# Patient Record
Sex: FEMALE | Race: WHITE | URBAN - METROPOLITAN AREA
[De-identification: names, ages, dates, MRNs, and addresses within clinical notes are randomized per-mention and may not be internally consistent; named-entity substitution may affect disease eponyms.]

---

## 2019-11-01 ENCOUNTER — MEDICAL CORRESPONDENCE (OUTPATIENT)
Dept: HEALTH INFORMATION MANAGEMENT | Facility: CLINIC | Age: 64
End: 2019-11-01

## 2019-11-01 ENCOUNTER — OFFICE VISIT (OUTPATIENT)
Dept: AUDIOLOGY | Facility: CLINIC | Age: 64
End: 2019-11-01
Payer: COMMERCIAL

## 2019-11-01 DIAGNOSIS — H90.3 SENSORY HEARING LOSS, BILATERAL: Primary | ICD-10-CM

## 2019-11-01 NOTE — PROGRESS NOTES
BACKGROUND INFORMATION: Dr. Ga Kwan implanted Jaky Arreola with a right  Advanced Compression Kinetics CII cochlear implant on 5/7/01 due to profound sensorineural hearing loss bilaterally and lack of benefit from hearing aids. She has hearing loss since childhood. Jaky was accompanied to today's appointment by her sister-in-law Lucia, as well as by an .     PATIENT REPORT: Patient uses both the [place] and PSP processors which are now obsolete. This along with reduced battery life and issues with phone connectivity are good reasons to look at an upgrade. She reports she mainly uses the [place] processor and uses the PSP for talking on a land line with her mother so she can plug it in to the phone.  She uses a TTY on her cell phone and captioning on her landline.     FITTING SESSION: The patient came to the clinic for adjustment to the programs in the external speech processor and for assessment of the external components of the cochlear implant system. These components provide power and data to the internal device. Sound is only heard once the external portion is activated. Postoperative treatment, including device fitting and adjustment, audiologic assessments, and training are required at regular intervals.   Processor type: [place] & PSP     TEST RESULTS:   Electrode Impedances: Electrode #3 was shown as open. This was disabled. Other electrodes were stable and within tolerances.  Neural Response Testing: Did not test  Facial Stimulation: Absent  Tinnitus: Absent  Balance Problems: Absent  Pain/Discomfort: Absent; no irritation from magnet  Strategies Tried: [employer] P [place] 120        Programs on [place]:  1. Clear Voice medium (down)  2. Empty for listening check  3. Clear Voice disabled (up)  All programs use 50:50 mixing due to patient preference.     Number of Channels per Program: 15     Programs on PSP: (Did not change today)  1. Phone adapter program: Aux only  2.  Everyday program  3. Noise program     Number of Channels per Program: 16     COMMENTS: The program that patient usually wears was reprogrammed for comfort. Jaky had difficulty selecting on the the loudness scale and instead a 3 point loudness scale was used. Electrode 3 was disabled due to an open circuit. It was attempted to Span electrode 3, however this is not supported in her current processor. Patient reports that sound quality is still the same was electrode disabled. Added program with ClearVoice activated so patient can try this. Discussed different options for upgrade. Patient mentioned liking a disposable battery to have at work or with her just in case. Also discussed the CROS option or the Rad Select. Patient will contact Yoly Wilson from [employer] to discuss different accessory options. Patient will also reach out to [employer] to start to upgrade process.     SUMMARY AND RECOMMENDATIONS: Jaky's processor was reprogrammed today and an upgrade was discussed.  MICHAELLE was completed in 2001 with maximum scores of 46%. Speech perception has not been completed since this time. It will be completed a few months after the upgrade.She will return to the clinic if additional adjustments are needed.    Niru Castellanos M.S.  Audiology Doctoral Extern  License #[id number]    I was present with the patient for the entire Audiology appointment including all procedures/testing performed by the AuD student, and agree with the student s assessment and plan as documented.      Palma Alfaro., CCC-A  Licensed Audiologist  [place] #[id number]

## 2019-11-04 ENCOUNTER — HEALTH MAINTENANCE LETTER (OUTPATIENT)
Age: 64
End: 2019-11-04

## 2019-11-18 ENCOUNTER — TELEPHONE (OUTPATIENT)
Dept: AUDIOLOGY | Facility: CLINIC | Age: 64
End: 2019-11-18

## 2019-12-23 ENCOUNTER — OFFICE VISIT (OUTPATIENT)
Dept: AUDIOLOGY | Facility: CLINIC | Age: 64
End: 2019-12-23
Payer: COMMERCIAL

## 2019-12-23 DIAGNOSIS — H90.3 SENSORY HEARING LOSS, BILATERAL: Primary | ICD-10-CM

## 2019-12-23 NOTE — PROGRESS NOTES
BACKGROUND INFORMATION: Dr. Ga Kwan implanted Jaky Arreola with a right  Advanced Five Apess CII cochlear implant on 5/7/01 due to profound sensorineural hearing loss bilaterally and lack of benefit from hearing aids. She has hearing loss since childhood. Jaky was accompanied to today's appointment by her sister-in-law Lucia, as well as by an .  She is being seen was seen in Audiology at the [facility] for initial programming of her Q90 CI on 12/23/2019.     PATIENT REPORT: Patient uses both the [place] and PSP processors which are now obsolete.  She got two Cielo Q90 processors due to the AB promotion. She uses a TTY on her cell phone and captioning on her landline.     FITTING SESSION: The patient came to the clinic for adjustment to the programs in the external speech processor and for assessment of the external components of the cochlear implant system. These components provide power and data to the internal device. Sound is only heard once the external portion is activated. Postoperative treatment, including device fitting and adjustment, audiologic assessments, and training are required at regular intervals.   Processor type: Cielo Q90 CI  Headpiece:  UHP  Magnet Strength:  2     TEST RESULTS:   Electrode Impedances: Electrode #3 was shown as open. This was disabled. Other electrodes were stable and within tolerances.  Neural Response Testing: Did not test  Facial Stimulation: Absent  Tinnitus: Absent  Balance Problems: Absent  Pain/Discomfort: Absent; no irritation from magnet  Strategies Tried: [employer] P [place] 120  Trials for the Q90: SCAN electrode 3:  No difference so keeping SCAN; IDR 50:  Tried IDR 60, but she felt it was too noisy; SoftSpeech:  No difference, but keeping it for quieter enviorment  IDR50, WindBlock; SoundRelax; SoftSpeech; ClearVoice; 50/50    Cielo Q90 CI  Programs  1. Calm  (surround)  2. UltraZoom  3. StereoZoom  15 Electrodes with electrode 3 open but with SCAN     Programs on [place]: Did not adjust  1. Clear Voice medium (down)  2. Empty for listening check  3. Clear Voice disabled (up)  All programs use 50:50 mixing due to patient preference.     Number of Channels per Program: 15     Programs on PSP: (Did not change today)  1. Phone adapter program: Aux only  2. Everyday program  3. Noise program     Number of Channels per Program: 16     COMMENTS: Patient given instructions in care and use of the new device.  The CROS was paired and she was instructed in its use (small open dome).  The Connect was paired and she was shown its use.  The products were registered and sent to the company.. She was told of the warranty for the extra products, and she is not planning on getting extended warranties at this time.    SUMMARY AND RECOMMENDATIONS: Jaky's new Cielo Q90 processors were initially programmed.  MICHAELLE was completed in 2001 with maximum scores of 46%. Speech perception has not been completed since this time. It will be completed a few months after the upgrade. When she returned we will investigate changing from 50/50 to 30/70 for the   Connect and whether to remove Sound Relax.  Questions were answered.      Marco Antonio Alfaro, CCC-A  Licensed Audiologist  [place] #[id number]

## 2020-02-11 ENCOUNTER — OFFICE VISIT (OUTPATIENT)
Dept: AUDIOLOGY | Facility: CLINIC | Age: 65
End: 2020-02-11
Payer: MEDICARE

## 2020-02-11 DIAGNOSIS — H90.3 SENSORY HEARING LOSS, BILATERAL: Primary | ICD-10-CM

## 2020-02-11 NOTE — PROGRESS NOTES
BACKGROUND INFORMATION: Dr. Ga Kwan implanted Jaky Arreola with a right  Advanced [employer] CII cochlear implant on 5/7/01 due to profound sensorineural hearing loss bilaterally and lack of benefit from hearing aids. She has hearing loss since childhood. Jaky was accompanied to today's appointment by her sister-in-law Lucia, as well as by an .  She is being seen was seen in Audiology at the [facility] for programming and review of her Q90 CI on 2/11/2020 (upgrade of Cielo Q90 CI was 12/23/2019).     PATIENT REPORT:  Relative to the new processors, she feels it is too loud and she wants to know the programs.  She was able to talk to her mother with audio only on the phone.  She had over 4 rings (one time 7 beeps) Patient used both the [place] and PSP processors which are now obsolete.  She got two Cielo Q90 processors due to the AB promotion. She uses a TTY on her cell phone and captioning on her landline.     FITTING SESSION: The patient came to the clinic for adjustment to the programs in the external speech processor and for assessment of the external components of the cochlear implant system. These components provide power and data to the internal device. Sound is only heard once the external portion is activated. Postoperative treatment, including device fitting and adjustment, audiologic assessments, and training are required at regular intervals.   Processor type: Cielo Q90 CI  Headpiece:  UHP  Magnet Strength:  2     TEST RESULTS:   Electrode Impedances: Electrode #3 was shown as open. This was disabled. Other electrodes were stable and within tolerances.  Neural Response Testing: Did not test  Facial Stimulation: Absent  Tinnitus: Absent  Balance Problems: Absent  Pain/Discomfort: Absent; no irritation from magnet  Strategies Tried: [employer] Hi[employer] P [place] 120  Trials for the Q90: SCAN electrode 3:  No difference  so keeping SCAN; IDR 50:  Tried IDR 60, but she felt it was too noisy; SoftSpeech:  No difference, but keeping it for quieter enviorment  IDR50, WindBlock; SoundRelax; SoftSpeech; ClearVoice; 50/50    Cielo Q90 CI  Programs  1. Calm (surround)  2. UltraZoom  3. StereoZoom  15 Electrodes with electrode 3 open but with SCAN     Programs on [place]: (Did not adjust today)  1. Clear Voice medium (down)  2. Empty for listening check  3. Clear Voice disabled (up)  All programs use 50:50 mixing due to patient preference.     Number of Channels per Program: 15     Programs on PSP: (Did not change today)  1. Phone adapter program: Aux only  2. Everyday program  3. Noise program     Number of Channels per Program: 16    METHOD: Speech perception testing is conducted at regular intervals to determine the degree of benefit the patient is obtaining from the cochlear implant. Tests are conducted using the cochlear implant without the benefit of lipreading. All tests are conducted in a sound-treated room. Perception of monosyllabic words and words in sentences are tested. Results usually show improvement over time. A decrease in performance indicates the need for re-programming of the prosthesis and/or replacement of components.     INTERVAL:  19 Years    TEST RESULTS:  15 minutes were spent assessing the patient s auditory rehabilitation status.  Speech Perception testing has not been done since 2001, with the maximum score for MICHAELLE Sentences at 46%    Device used for Testing:   Right ear: [email or web address] Cielo Q90 CI    Soundfield Aided Thresholds: Mild moderate levels  Unaided Thresholds: DNT  Tympanograms: DNT    CNC Words Test:  The patient repeats 25 single syllable words, auditory only. The words are presented at 60 dB SPL (conversational level) delivered from a CD player.    19 Years Post-Activation of CI:   Right: 20% words and 52% phonemes    The Hearing in Noise Test (HINT):  The patient repeats 20 sentences, auditory  only.   The sentences are presented in each condition at 60 dB SPL (conversational level) delivered from a CD player.    19 Years Post-Activation of CI:   Right: 47%    COMMENTS: Patient's program was adjusted to comfort, and her programs were reviewed.  She is using the CROS, and had some discomfort.   It was discovered she had a lot of cerumen, and it will be removed when she sees her physician next week. She is getting speech level input, and similar scores for sentences from 19 years at her last testing.    SUMMARY AND RECOMMENDATIONS: Jaky's processor was programmed and performance baseline established.  She should return in a year, or sooner if problems.  Questions were answered.      Marco Antonio Alfaro, CCC-A  Licensed Audiologist  [place] #[id number]

## 2020-02-12 DIAGNOSIS — H91.90 HEARING LOSS: Primary | ICD-10-CM

## 2020-11-22 ENCOUNTER — HEALTH MAINTENANCE LETTER (OUTPATIENT)
Age: 65
End: 2020-11-22

## 2021-03-07 ENCOUNTER — IMMUNIZATION (OUTPATIENT)
Dept: NURSING | Facility: CLINIC | Age: 66
End: 2021-03-07
Payer: MEDICARE

## 2021-03-07 PROCEDURE — 91303 PR COVID VAC JANSSEN AD26 0.5ML: CPT

## 2021-03-07 PROCEDURE — T1013 SIGN LANG/ORAL INTERPRETER: HCPCS | Mod: U3

## 2021-03-07 PROCEDURE — 0031A PR COVID VAC JANSSEN AD26 0.5ML: CPT

## 2021-09-18 ENCOUNTER — HEALTH MAINTENANCE LETTER (OUTPATIENT)
Age: 66
End: 2021-09-18

## 2021-11-13 ENCOUNTER — HEALTH MAINTENANCE LETTER (OUTPATIENT)
Age: 66
End: 2021-11-13

## 2022-01-08 ENCOUNTER — HEALTH MAINTENANCE LETTER (OUTPATIENT)
Age: 67
End: 2022-01-08

## 2022-11-20 ENCOUNTER — HEALTH MAINTENANCE LETTER (OUTPATIENT)
Age: 67
End: 2022-11-20

## 2023-04-15 ENCOUNTER — HEALTH MAINTENANCE LETTER (OUTPATIENT)
Age: 68
End: 2023-04-15

## 2023-11-25 ENCOUNTER — HEALTH MAINTENANCE LETTER (OUTPATIENT)
Age: 68
End: 2023-11-25